# Patient Record
Sex: MALE | Race: WHITE | Employment: UNEMPLOYED | URBAN - METROPOLITAN AREA
[De-identification: names, ages, dates, MRNs, and addresses within clinical notes are randomized per-mention and may not be internally consistent; named-entity substitution may affect disease eponyms.]

---

## 2022-02-06 ENCOUNTER — HOSPITAL ENCOUNTER (EMERGENCY)
Age: 27
Discharge: HOME | End: 2022-02-06
Attending: EMERGENCY MEDICINE

## 2022-02-06 ENCOUNTER — APPOINTMENT (OUTPATIENT)
Dept: CT IMAGING | Age: 27
End: 2022-02-06
Attending: STUDENT IN AN ORGANIZED HEALTH CARE EDUCATION/TRAINING PROGRAM

## 2022-02-06 VITALS
DIASTOLIC BLOOD PRESSURE: 95 MMHG | OXYGEN SATURATION: 100 % | HEART RATE: 80 BPM | RESPIRATION RATE: 16 BRPM | TEMPERATURE: 98.3 F | SYSTOLIC BLOOD PRESSURE: 163 MMHG

## 2022-02-06 DIAGNOSIS — K52.9 GASTROENTERITIS, ACUTE: Primary | ICD-10-CM

## 2022-02-06 LAB
ALBUMIN SERPL-MCNC: 4.5 G/DL (ref 3.5–5)
ALBUMIN/GLOB SERPL: 1.3 {RATIO} (ref 1.1–2.2)
ALP SERPL-CCNC: 112 U/L (ref 45–117)
ALT SERPL-CCNC: 27 U/L (ref 12–78)
ANION GAP SERPL CALC-SCNC: 4 MMOL/L (ref 5–15)
APPEARANCE UR: CLEAR
AST SERPL-CCNC: 16 U/L (ref 15–37)
BACTERIA URNS QL MICRO: NEGATIVE /HPF
BASOPHILS # BLD: 0 K/UL (ref 0–0.1)
BASOPHILS NFR BLD: 0 % (ref 0–1)
BILIRUB SERPL-MCNC: 0.8 MG/DL (ref 0.2–1)
BILIRUB UR QL: NEGATIVE
BUN SERPL-MCNC: 19 MG/DL (ref 6–20)
BUN/CREAT SERPL: 16 (ref 12–20)
CALCIUM SERPL-MCNC: 9.8 MG/DL (ref 8.5–10.1)
CHLORIDE SERPL-SCNC: 105 MMOL/L (ref 97–108)
CO2 SERPL-SCNC: 28 MMOL/L (ref 21–32)
COLOR UR: ABNORMAL
COMMENT, HOLDF: NORMAL
CREAT SERPL-MCNC: 1.18 MG/DL (ref 0.7–1.3)
DIFFERENTIAL METHOD BLD: ABNORMAL
EOSINOPHIL # BLD: 0.1 K/UL (ref 0–0.4)
EOSINOPHIL NFR BLD: 1 % (ref 0–7)
EPITH CASTS URNS QL MICRO: ABNORMAL /LPF
ERYTHROCYTE [DISTWIDTH] IN BLOOD BY AUTOMATED COUNT: 11.9 % (ref 11.5–14.5)
GLOBULIN SER CALC-MCNC: 3.6 G/DL (ref 2–4)
GLUCOSE SERPL-MCNC: 166 MG/DL (ref 65–100)
GLUCOSE UR STRIP.AUTO-MCNC: NEGATIVE MG/DL
HCT VFR BLD AUTO: 53.2 % (ref 36.6–50.3)
HGB BLD-MCNC: 18.3 G/DL (ref 12.1–17)
HGB UR QL STRIP: NEGATIVE
IMM GRANULOCYTES # BLD AUTO: 0 K/UL (ref 0–0.04)
IMM GRANULOCYTES NFR BLD AUTO: 0 % (ref 0–0.5)
KETONES UR QL STRIP.AUTO: ABNORMAL MG/DL
LEUKOCYTE ESTERASE UR QL STRIP.AUTO: NEGATIVE
LIPASE SERPL-CCNC: 63 U/L (ref 73–393)
LYMPHOCYTES # BLD: 0.1 K/UL (ref 0.8–3.5)
LYMPHOCYTES NFR BLD: 1 % (ref 12–49)
MCH RBC QN AUTO: 31.3 PG (ref 26–34)
MCHC RBC AUTO-ENTMCNC: 34.4 G/DL (ref 30–36.5)
MCV RBC AUTO: 90.9 FL (ref 80–99)
MONOCYTES # BLD: 0.8 K/UL (ref 0–1)
MONOCYTES NFR BLD: 6 % (ref 5–13)
MUCOUS THREADS URNS QL MICRO: ABNORMAL /LPF
NEUTS SEG # BLD: 11.9 K/UL (ref 1.8–8)
NEUTS SEG NFR BLD: 92 % (ref 32–75)
NITRITE UR QL STRIP.AUTO: NEGATIVE
NRBC # BLD: 0 K/UL (ref 0–0.01)
NRBC BLD-RTO: 0 PER 100 WBC
PH UR STRIP: 6 [PH] (ref 5–8)
PLATELET # BLD AUTO: 345 K/UL (ref 150–400)
PMV BLD AUTO: 9.3 FL (ref 8.9–12.9)
POTASSIUM SERPL-SCNC: 4.2 MMOL/L (ref 3.5–5.1)
PROT SERPL-MCNC: 8.1 G/DL (ref 6.4–8.2)
PROT UR STRIP-MCNC: ABNORMAL MG/DL
RBC # BLD AUTO: 5.85 M/UL (ref 4.1–5.7)
RBC #/AREA URNS HPF: ABNORMAL /HPF (ref 0–5)
RBC MORPH BLD: ABNORMAL
SAMPLES BEING HELD,HOLD: NORMAL
SODIUM SERPL-SCNC: 137 MMOL/L (ref 136–145)
SP GR UR REFRACTOMETRY: 1.03 (ref 1–1.03)
UR CULT HOLD, URHOLD: NORMAL
UROBILINOGEN UR QL STRIP.AUTO: 0.2 EU/DL (ref 0.2–1)
WBC # BLD AUTO: 12.9 K/UL (ref 4.1–11.1)
WBC URNS QL MICRO: ABNORMAL /HPF (ref 0–4)

## 2022-02-06 PROCEDURE — 96375 TX/PRO/DX INJ NEW DRUG ADDON: CPT

## 2022-02-06 PROCEDURE — 96361 HYDRATE IV INFUSION ADD-ON: CPT

## 2022-02-06 PROCEDURE — 96374 THER/PROPH/DIAG INJ IV PUSH: CPT

## 2022-02-06 PROCEDURE — 74011250636 HC RX REV CODE- 250/636: Performed by: STUDENT IN AN ORGANIZED HEALTH CARE EDUCATION/TRAINING PROGRAM

## 2022-02-06 PROCEDURE — 80053 COMPREHEN METABOLIC PANEL: CPT

## 2022-02-06 PROCEDURE — 81001 URINALYSIS AUTO W/SCOPE: CPT

## 2022-02-06 PROCEDURE — 99283 EMERGENCY DEPT VISIT LOW MDM: CPT

## 2022-02-06 PROCEDURE — 83690 ASSAY OF LIPASE: CPT

## 2022-02-06 PROCEDURE — 85025 COMPLETE CBC W/AUTO DIFF WBC: CPT

## 2022-02-06 PROCEDURE — 36415 COLL VENOUS BLD VENIPUNCTURE: CPT

## 2022-02-06 RX ORDER — ONDANSETRON 4 MG/1
4 TABLET, ORALLY DISINTEGRATING ORAL
Qty: 24 TABLET | Refills: 0 | Status: SHIPPED | OUTPATIENT
Start: 2022-02-06

## 2022-02-06 RX ORDER — AZITHROMYCIN 500 MG/1
TABLET, FILM COATED ORAL
Qty: 3 TABLET | Refills: 0 | Status: SHIPPED | OUTPATIENT
Start: 2022-02-06

## 2022-02-06 RX ORDER — KETOROLAC TROMETHAMINE 30 MG/ML
30 INJECTION, SOLUTION INTRAMUSCULAR; INTRAVENOUS
Status: COMPLETED | OUTPATIENT
Start: 2022-02-06 | End: 2022-02-06

## 2022-02-06 RX ORDER — ONDANSETRON 2 MG/ML
4 INJECTION INTRAMUSCULAR; INTRAVENOUS
Status: COMPLETED | OUTPATIENT
Start: 2022-02-06 | End: 2022-02-06

## 2022-02-06 RX ADMIN — SODIUM CHLORIDE 1000 ML: 9 INJECTION, SOLUTION INTRAVENOUS at 15:03

## 2022-02-06 RX ADMIN — KETOROLAC TROMETHAMINE 30 MG: 30 INJECTION, SOLUTION INTRAMUSCULAR; INTRAVENOUS at 16:43

## 2022-02-06 RX ADMIN — ONDANSETRON HYDROCHLORIDE 4 MG: 2 SOLUTION INTRAMUSCULAR; INTRAVENOUS at 15:02

## 2022-02-06 RX ADMIN — SODIUM CHLORIDE 1000 ML: 9 INJECTION, SOLUTION INTRAVENOUS at 16:08

## 2022-02-06 NOTE — ED TRIAGE NOTES
Triage: Pt arrives ambulatory from home with CC of vomiting and diarrhea since 0500. PT reports he cannot keep any food down. He endorses some diffuse abdominal pain.

## 2022-02-06 NOTE — ED PROVIDER NOTES
Patient is a 32year old male who presents to ED c/o N/V/D which started at 5am this morning. Patient reports abdominal pain, body aches, fatigue and that he is unable to keep any food or liquids down. Reports multiple episodes of NBNB emesis and diarrhea. He has asian food last night for dinner. He denies fever, chills, or blood in stool. Also denies any known sick contacts. Past Medical History:   Diagnosis Date    Anxiety     Depression        Past Surgical History:   Procedure Laterality Date    HX TONSIL AND ADENOIDECTOMY           Family History:   Problem Relation Age of Onset    Anxiety Maternal Grandmother     Depression Maternal Grandmother     Diabetes Maternal Grandmother     High Cholesterol Maternal Grandfather     Cancer Maternal Grandfather         Skin/Prostate    Hypertension Maternal Grandfather     Celiac Disease Brother     Heart Attack Paternal Grandfather        Social History     Socioeconomic History    Marital status: SINGLE     Spouse name: Not on file    Number of children: Not on file    Years of education: Not on file    Highest education level: Not on file   Occupational History    Not on file   Tobacco Use    Smoking status: Current Some Day Smoker     Packs/day: 0.25     Years: 4.00     Pack years: 1.00    Smokeless tobacco: Current User    Tobacco comment: 1/2 pack per week    Substance and Sexual Activity    Alcohol use:  Yes     Alcohol/week: 15.0 standard drinks     Types: 15 Standard drinks or equivalent per week    Drug use: Yes     Types: Marijuana    Sexual activity: Yes     Partners: Female     Birth control/protection: Condom   Other Topics Concern    Not on file   Social History Narrative    Not on file     Social Determinants of Health     Financial Resource Strain:     Difficulty of Paying Living Expenses: Not on file   Food Insecurity:     Worried About Running Out of Food in the Last Year: Not on file    [address] in the Last Year: Not on file   Transportation Needs:     Lack of Transportation (Medical): Not on file    Lack of Transportation (Non-Medical): Not on file   Physical Activity:     Days of Exercise per Week: Not on file    Minutes of Exercise per Session: Not on file   Stress:     Feeling of Stress : Not on file   Social Connections:     Frequency of Communication with Friends and Family: Not on file    Frequency of Social Gatherings with Friends and Family: Not on file    Attends Nondenominational Services: Not on file    Active Member of [address] [place] [address] [employer] or Organizations: Not on file    Attends Club or Organization Meetings: Not on file    Marital Status: Not on file   Intimate Partner Violence:     Fear of Current or Ex-Partner: Not on file    Emotionally Abused: Not on file    Physically Abused: Not on file    Sexually Abused: Not on file   Housing Stability:     Unable to Pay for Housing in the Last Year: Not on file    Number of [place] in the Last Year: Not on file    Unstable Housing in the Last Year: Not on file         ALLERGIES: Patient has no known allergies. Review of Systems   Constitutional: Negative for chills and fever. Gastrointestinal: Positive for abdominal pain, diarrhea, nausea and vomiting. Negative for blood in stool. All other systems reviewed and are negative. Vitals:    02/06/22 1409   BP: (!) 163/95   Pulse: (!) 115   Resp: 16   Temp: 98.3 °F (36.8 °C)   SpO2: 99%            Physical Exam  Vitals and nursing note reviewed. Constitutional:       General: He is not in acute distress. Appearance: Normal appearance. He is well-developed. He is not toxic-appearing. HENT:      Head: Normocephalic and atraumatic. Nose: Nose normal.      Mouth/Throat:      Mouth: Mucous membranes are moist.   Eyes:      General: Lids are normal.      Extraocular Movements: Extraocular movements intact.       Conjunctiva/sclera: Conjunctivae normal.   Cardiovascular:      Rate and Rhythm: Regular rhythm. Tachycardia present. Pulses: Normal pulses. Heart sounds: Normal heart sounds, S1 normal and S2 normal.   Pulmonary:      Effort: Pulmonary effort is normal. No accessory muscle usage. Breath sounds: Normal breath sounds. Abdominal:      Palpations: Abdomen is soft. Tenderness: There is no abdominal tenderness. There is no right CVA tenderness, left CVA tenderness, guarding or rebound. Musculoskeletal:         General: Normal range of motion. Cervical back: Normal range of motion and neck supple. Skin:     General: Skin is warm and dry. Capillary Refill: Capillary refill takes less than 2 seconds. Neurological:      General: No focal deficit present. Mental Status: He is alert and oriented to person, place, and time. Mental status is at baseline. Psychiatric:         Attention and Perception: Attention normal.         Mood and Affect: Mood and affect normal.         Speech: Speech normal.         Behavior: Behavior is cooperative. Thought Content: Thought content normal.         Cognition and Memory: Cognition normal.         Judgment: Judgment normal.          MDM  Number of Diagnoses or Management Options  Gastroenteritis, acute  Diagnosis management comments: Patient with gastroenteritis due to eating asian food last night. He was given iv fluids, zofran and toradol. Offered CT abdomen/pelvis because patient did have abdominal pain, but he declined and abdominal pain had resolved. Discussed symptomatic care. Given rx for azithromycin if symptoms seem to persist or become infectious in nature. Return to ER warnings discussed in detail with patient.         Amount and/or Complexity of Data Reviewed  Clinical lab tests: reviewed  Discuss the patient with other providers: yes (Dr. Patti Watson, ED attending )           Procedures